# Patient Record
Sex: MALE | Race: WHITE | Employment: UNEMPLOYED | URBAN - METROPOLITAN AREA
[De-identification: names, ages, dates, MRNs, and addresses within clinical notes are randomized per-mention and may not be internally consistent; named-entity substitution may affect disease eponyms.]

---

## 2020-07-16 ENCOUNTER — HOSPITAL ENCOUNTER (EMERGENCY)
Age: 50
Discharge: HOME | End: 2020-07-16

## 2020-07-16 VITALS
RESPIRATION RATE: 18 BRPM | DIASTOLIC BLOOD PRESSURE: 92 MMHG | BODY MASS INDEX: 21.14 KG/M2 | TEMPERATURE: 99 F | WEIGHT: 135 LBS | HEART RATE: 98 BPM | OXYGEN SATURATION: 97 % | SYSTOLIC BLOOD PRESSURE: 134 MMHG

## 2020-07-16 PROCEDURE — 2580000003 HC RX 258: Performed by: PHYSICIAN ASSISTANT

## 2020-07-16 PROCEDURE — 6370000000 HC RX 637 (ALT 250 FOR IP): Performed by: PHYSICIAN ASSISTANT

## 2020-07-16 PROCEDURE — 99282 EMERGENCY DEPT VISIT SF MDM: CPT

## 2020-07-16 RX ORDER — HYDROCODONE BITARTRATE AND ACETAMINOPHEN 5; 325 MG/1; MG/1
1 TABLET ORAL ONCE
Status: COMPLETED | OUTPATIENT
Start: 2020-07-16 | End: 2020-07-16

## 2020-07-16 RX ORDER — MAGNESIUM HYDROXIDE 1200 MG/15ML
1000 LIQUID ORAL ONCE
Status: COMPLETED | OUTPATIENT
Start: 2020-07-16 | End: 2020-07-16

## 2020-07-16 RX ORDER — AMOXICILLIN AND CLAVULANATE POTASSIUM 875; 125 MG/1; MG/1
1 TABLET, FILM COATED ORAL 2 TIMES DAILY
Qty: 20 TABLET | Refills: 0 | Status: SHIPPED | OUTPATIENT
Start: 2020-07-16 | End: 2020-07-26

## 2020-07-16 RX ORDER — HYDROCODONE BITARTRATE AND ACETAMINOPHEN 5; 325 MG/1; MG/1
1 TABLET ORAL EVERY 8 HOURS PRN
Qty: 9 TABLET | Refills: 0 | Status: SHIPPED | OUTPATIENT
Start: 2020-07-16 | End: 2020-07-19

## 2020-07-16 RX ORDER — AMOXICILLIN AND CLAVULANATE POTASSIUM 875; 125 MG/1; MG/1
1 TABLET, FILM COATED ORAL ONCE
Status: COMPLETED | OUTPATIENT
Start: 2020-07-16 | End: 2020-07-16

## 2020-07-16 RX ADMIN — HYDROCODONE BITARTRATE AND ACETAMINOPHEN 1 TABLET: 5; 325 TABLET ORAL at 23:20

## 2020-07-16 RX ADMIN — WATER 1000 ML: 1 IRRIGANT IRRIGATION at 23:21

## 2020-07-16 RX ADMIN — AMOXICILLIN AND CLAVULANATE POTASSIUM 1 TABLET: 875; 125 TABLET, FILM COATED ORAL at 23:20

## 2020-07-16 SDOH — HEALTH STABILITY: MENTAL HEALTH: HOW OFTEN DO YOU HAVE A DRINK CONTAINING ALCOHOL?: NEVER

## 2020-07-16 ASSESSMENT — ENCOUNTER SYMPTOMS
COUGH: 0
DIARRHEA: 0
SORE THROAT: 0
NAUSEA: 0
ABDOMINAL PAIN: 0
VOMITING: 0
EYE PAIN: 0
RHINORRHEA: 0
PHOTOPHOBIA: 0
BACK PAIN: 0
SHORTNESS OF BREATH: 0

## 2020-07-16 ASSESSMENT — PAIN DESCRIPTION - ORIENTATION: ORIENTATION: RIGHT

## 2020-07-16 ASSESSMENT — PAIN DESCRIPTION - FREQUENCY: FREQUENCY: INTERMITTENT

## 2020-07-16 ASSESSMENT — PAIN SCALES - GENERAL
PAINLEVEL_OUTOF10: 6
PAINLEVEL_OUTOF10: 7

## 2020-07-16 ASSESSMENT — PAIN DESCRIPTION - PAIN TYPE: TYPE: ACUTE PAIN

## 2020-07-16 ASSESSMENT — PAIN DESCRIPTION - DESCRIPTORS: DESCRIPTORS: TENDER

## 2020-07-16 ASSESSMENT — PAIN DESCRIPTION - LOCATION: LOCATION: ARM

## 2020-07-17 NOTE — ED NOTES
Patient given discharge instructions and prescriptions and verbalized understanding. Vital signs stable. Resp even and unlabored. Skin warm, dry and intact. Patient is alert and oriented. Patient doesn't have any questions at this time.         Bonifacio Victor RN  07/16/20 [id number]

## 2020-07-17 NOTE — ED PROVIDER NOTES
 Highest education level: None   Occupational History    None   Social Needs    Financial resource strain: None    Food insecurity     Worry: None     Inability: None    Transportation needs     Medical: None     Non-medical: None   Tobacco Use    Smoking status: Never Smoker    Smokeless tobacco: Never Used   Substance and Sexual Activity    Alcohol use: Never     Frequency: Never    Drug use: Never    Sexual activity: None   Lifestyle    Physical activity     Days per week: None     Minutes per session: None    Stress: None   Relationships    Social connections     Talks on phone: None     Gets together: None     Attends [employer] service: None     Active member of club or organization: None     Attends meetings of clubs or organizations: None     Relationship status: None    Intimate partner violence     Fear of current or ex partner: None     Emotionally abused: None     Physically abused: None     Forced sexual activity: None   Other Topics Concern    None   Social History Narrative    None       SCREENINGS             PHYSICAL EXAM    (up to 7 for level 4, 8 or more for level 5)     ED Triage Vitals [07/16/20 2155]   BP Temp Temp Source Pulse Resp SpO2 Height Weight   (!) 136/97 99 °F (37.2 °C) Oral 111 18 96 % -- 135 lb (61.2 kg)       Physical Exam  Vitals signs and nursing note reviewed. Constitutional:       General: He is not in acute distress. Appearance: Normal appearance. He is well-developed. He is not diaphoretic. HENT:      Head: Normocephalic and atraumatic. Eyes:      General: Lids are normal.      Conjunctiva/sclera: Conjunctivae normal.   Neck:      Musculoskeletal: Normal range of motion and neck supple. Cardiovascular:      Rate and Rhythm: Normal rate and regular rhythm. Pulses: Normal pulses. Heart sounds: Normal heart sounds. Pulmonary:      Effort: Pulmonary effort is normal.      Breath sounds: Normal breath sounds.    Abdominal:      General: Bowel sounds are normal.      Palpations: Abdomen is soft. Tenderness: There is no abdominal tenderness. Musculoskeletal:      Right elbow: He exhibits laceration. Left forearm: He exhibits laceration. Arms:    Lymphadenopathy:      Cervical: No cervical adenopathy. Skin:     General: Skin is warm and dry. Capillary Refill: Capillary refill takes less than 2 seconds. Findings: No rash. Neurological:      Mental Status: He is alert and oriented to person, place, and time. Psychiatric:         Thought Content: Thought content normal.         Judgment: Judgment normal.           All other labs were within normal range or not returned as of this dictation. EMERGENCY DEPARTMENT COURSE and DIFFERENTIALDIAGNOSIS/MDM:   Vitals:    Vitals:    07/16/20 2155   BP: (!) 136/97   Pulse: 111   Resp: 18   Temp: 99 °F (37.2 °C)   TempSrc: Oral   SpO2: 96%   Weight: 135 lb (61.2 kg)            Patient is nontoxic no acute distress afebrile hemodynamically stable. Is his own dog who bit him and dog is up-to-date on immunizations patient is not concerned for rabies declined rabies prophylaxis while in the emergency department. Wounds were thoroughly irrigated. Patient was provided Augmentin while in the emergency department as well as an analgesic. He was instructed no driving on the pain medication. He was advised to play close attention to the wounds due to high risk for infection. He was advised to return to the ED for redness, drainage, fevers increased pain or swelling to the area. Patient has full range of motion of the joint at this time, I stressed because the laceration is close to the joint that there is high risk for infection so it is extremely important to take the antibiotic and have a close watch on it and have someone else also look at it since it is on the back of his elbow. Advised for wound check in 2 days. Patient verbalized understanding of this plan.   Patient stable for discharge      PROCEDURES:  Unless otherwise noted below, none     Procedures      FINAL IMPRESSION      1.  Dog bite, initial encounter          DISPOSITION/PLAN   DISPOSITION Discharge - Pending Orders Complete 07/16/2020 11:20:21 PM          Darlin Ingram PA-C (electronically signed)  Attending Emergency Physician       April Mari PA-C  07/16/20 [id number]

## 2020-07-17 NOTE — ED NOTES
Wounds on right upper forearm and elbow cleansed, steri strip applied and wounds bandaged. Patient tolerated well.       Kamran Caal RN  07/16/20 [id number]